# Patient Record
Sex: MALE | Race: WHITE
[De-identification: names, ages, dates, MRNs, and addresses within clinical notes are randomized per-mention and may not be internally consistent; named-entity substitution may affect disease eponyms.]

---

## 2021-06-01 PROBLEM — Z00.00 ENCOUNTER FOR PREVENTIVE HEALTH EXAMINATION: Status: ACTIVE | Noted: 2021-06-01

## 2021-06-01 RX ORDER — PREDNISONE 10 MG/1
10 TABLET ORAL
Qty: 60 | Refills: 0 | Status: ACTIVE | COMMUNITY
Start: 2021-06-01 | End: 1900-01-01

## 2022-06-17 ENCOUNTER — APPOINTMENT (OUTPATIENT)
Dept: PEDIATRIC ORTHOPEDIC SURGERY | Facility: CLINIC | Age: 62
End: 2022-06-17
Payer: COMMERCIAL

## 2022-06-17 VITALS — HEIGHT: 66 IN | WEIGHT: 147 LBS | BODY MASS INDEX: 23.63 KG/M2

## 2022-06-17 DIAGNOSIS — Z82.49 FAMILY HISTORY OF ISCHEMIC HEART DISEASE AND OTHER DISEASES OF THE CIRCULATORY SYSTEM: ICD-10-CM

## 2022-06-17 DIAGNOSIS — Z72.89 OTHER PROBLEMS RELATED TO LIFESTYLE: ICD-10-CM

## 2022-06-17 DIAGNOSIS — Z82.61 FAMILY HISTORY OF ARTHRITIS: ICD-10-CM

## 2022-06-17 DIAGNOSIS — Z78.9 OTHER SPECIFIED HEALTH STATUS: ICD-10-CM

## 2022-06-17 DIAGNOSIS — Z86.19 PERSONAL HISTORY OF OTHER INFECTIOUS AND PARASITIC DISEASES: ICD-10-CM

## 2022-06-17 DIAGNOSIS — M54.30 DORSALGIA, UNSPECIFIED: ICD-10-CM

## 2022-06-17 DIAGNOSIS — Z83.79 FAMILY HISTORY OF OTHER DISEASES OF THE DIGESTIVE SYSTEM: ICD-10-CM

## 2022-06-17 DIAGNOSIS — M54.9 DORSALGIA, UNSPECIFIED: ICD-10-CM

## 2022-06-17 PROCEDURE — 20552 NJX 1/MLT TRIGGER POINT 1/2: CPT

## 2022-06-17 PROCEDURE — 99213 OFFICE O/P EST LOW 20 MIN: CPT | Mod: 25

## 2022-06-20 PROBLEM — Z86.19 HISTORY OF LYME DISEASE: Status: RESOLVED | Noted: 2022-06-17 | Resolved: 2022-06-20

## 2022-06-20 PROBLEM — Z72.89 CONSUMES ALCOHOL: Status: ACTIVE | Noted: 2022-06-17

## 2022-06-20 PROBLEM — M54.9 BACK PAIN WITH SCIATICA: Status: ACTIVE | Noted: 2021-06-01

## 2022-06-20 PROBLEM — Z82.61 FAMILY HISTORY OF ARTHRITIS: Status: ACTIVE | Noted: 2022-06-17

## 2022-06-20 PROBLEM — Z82.49 FAMILY HISTORY OF CARDIAC DISORDER: Status: ACTIVE | Noted: 2022-06-17

## 2022-06-20 PROBLEM — Z78.9 NON-SMOKER: Status: ACTIVE | Noted: 2022-06-17

## 2022-06-20 PROBLEM — Z78.9 DOES NOT USE ILLICIT DRUGS: Status: ACTIVE | Noted: 2022-06-17

## 2022-06-20 PROBLEM — Z83.79 FAMILY HISTORY OF GASTRIC ULCER: Status: ACTIVE | Noted: 2022-06-17

## 2022-06-20 NOTE — HISTORY OF PRESENT ILLNESS
[FreeTextEntry1] : This patient returns with increasing right-sided low back pain.  This patient has a underlying diagnosis of fibromyalgia.  The pain does radiate down the right leg.

## 2022-06-20 NOTE — PROCEDURE
[de-identified] : 2 mils of 1% plain lidocaine and 1 mL of 40 mg of Kenalog have been injected into the right lumbar musculature (trigger point injection)

## 2022-06-20 NOTE — ASSESSMENT
[FreeTextEntry1] : Right lumbar radiculitis with myalgia\par \par Patient will continue symptomatic treatment.

## 2022-06-20 NOTE — PHYSICAL EXAM
[FreeTextEntry1] : On physical examination the patient has limited range of motion of the lumbar spine.  Right lateral bending and rotation increases his back and right leg pain.  Straight leg raising is positive on the right at 50 degrees.  Motor sensory and deep tendon reflex examination of both lower extremities is within normal limits.

## 2022-10-25 ENCOUNTER — APPOINTMENT (OUTPATIENT)
Dept: PEDIATRIC ORTHOPEDIC SURGERY | Facility: CLINIC | Age: 62
End: 2022-10-25

## 2022-10-25 VITALS — BODY MASS INDEX: 23.63 KG/M2 | WEIGHT: 147 LBS | HEIGHT: 66 IN | TEMPERATURE: 97.8 F

## 2022-10-25 DIAGNOSIS — M54.16 RADICULOPATHY, LUMBAR REGION: ICD-10-CM

## 2022-10-25 DIAGNOSIS — M79.18 MYALGIA, OTHER SITE: ICD-10-CM

## 2022-10-25 PROCEDURE — 20552 NJX 1/MLT TRIGGER POINT 1/2: CPT

## 2022-10-25 PROCEDURE — 99213 OFFICE O/P EST LOW 20 MIN: CPT | Mod: 25

## 2022-10-25 NOTE — PROCEDURE
[de-identified] : The right and left lumbar musculature have been injected with 2 mL of 1% plain lidocaine and 1 mL of 40 mg of Depo-Medrol.  (2 trigger point injections)\par Purpose for trigger point injections: To help the lumbar muscle pain and spasm

## 2022-10-25 NOTE — HISTORY OF PRESENT ILLNESS
[de-identified] : This 62-year-old male with fibromyalgia and a history of lumbar radiculitis with lumbar muscle pain returns because of marked increase in bilateral lumbar muscle pain and tenderness.  He has pain radiating into both buttocks.  He states that when the pain comes he has a feeling that his legs will give way.

## 2022-10-25 NOTE — PHYSICAL EXAM
[de-identified] : On physical examination the patient has bilateral lumbar muscle spasm and tenderness.  Straight leg raising is positive bilaterally at 50 degrees.  Motor sensory and deep tendon reflex examination of both lower extremities is within normal limits.

## 2022-10-25 NOTE — ASSESSMENT
[FreeTextEntry1] : Lumbar radiculitis right and left\par Lumbar muscle pain\par \par The patient will continue symptomatic treatment and he will return on a as needed basis.